# Patient Record
Sex: MALE | Race: WHITE | URBAN - METROPOLITAN AREA
[De-identification: names, ages, dates, MRNs, and addresses within clinical notes are randomized per-mention and may not be internally consistent; named-entity substitution may affect disease eponyms.]

---

## 2021-10-08 ENCOUNTER — OFFICE VISIT (OUTPATIENT)
Dept: URBAN - METROPOLITAN AREA CLINIC 23 | Facility: CLINIC | Age: 56
End: 2021-10-08

## 2021-10-18 ENCOUNTER — WEB ENCOUNTER (OUTPATIENT)
Dept: URBAN - METROPOLITAN AREA CLINIC 23 | Facility: CLINIC | Age: 56
End: 2021-10-18

## 2021-10-18 ENCOUNTER — OFFICE VISIT (OUTPATIENT)
Dept: URBAN - METROPOLITAN AREA CLINIC 23 | Facility: CLINIC | Age: 56
End: 2021-10-18
Payer: COMMERCIAL

## 2021-10-18 ENCOUNTER — LAB OUTSIDE AN ENCOUNTER (OUTPATIENT)
Dept: URBAN - METROPOLITAN AREA CLINIC 23 | Facility: CLINIC | Age: 56
End: 2021-10-18

## 2021-10-18 VITALS
WEIGHT: 196 LBS | HEART RATE: 75 BPM | HEIGHT: 73 IN | SYSTOLIC BLOOD PRESSURE: 127 MMHG | TEMPERATURE: 96.2 F | DIASTOLIC BLOOD PRESSURE: 90 MMHG | BODY MASS INDEX: 25.98 KG/M2

## 2021-10-18 DIAGNOSIS — R10.12 LUQ ABDOMINAL PAIN: ICD-10-CM

## 2021-10-18 DIAGNOSIS — R12 HEARTBURN: ICD-10-CM

## 2021-10-18 DIAGNOSIS — Z86.010 PERSONAL HISTORY OF COLON POLYPS: ICD-10-CM

## 2021-10-18 DIAGNOSIS — R10.13 DYSPEPSIA: ICD-10-CM

## 2021-10-18 PROCEDURE — 99214 OFFICE O/P EST MOD 30 MIN: CPT | Performed by: INTERNAL MEDICINE

## 2021-10-18 PROCEDURE — 99244 OFF/OP CNSLTJ NEW/EST MOD 40: CPT | Performed by: INTERNAL MEDICINE

## 2021-10-18 RX ORDER — ROSUVASTATIN CALCIUM 20 MG/1
1 TABLET TABLET, FILM COATED ORAL ONCE A DAY
Status: ACTIVE | COMMUNITY

## 2021-10-18 RX ORDER — LISINOPRIL 10 MG/1
1 TABLET TABLET ORAL ONCE A DAY
Status: ACTIVE | COMMUNITY

## 2021-10-18 NOTE — HPI-TODAY'S VISIT:
- 57 yo  male from [place], referred by Dr. Gurwinder Juarez for further evaluation of a NEW GASTROINTESTINAL COMPLAINT: LUQ abdominal pain.  A copy of this note will be sent to the referring provider. - LUQ abdominal pain over the past 6 months.  The pain is intermittent, occurring 2-3 times per week.  Describes it as a dull ache occurring postprandially, 2-3/10 in intensity, nonradiating.  The discomfort lasts a couple of hours prior to subsiding.  States he has had IBS all his life but that this discomfort is different.  Denies nausea or vomiting. - Gets heartburn and dyspepsia symptoms 2-3 times per month for which he may take Tums PRN - As directed by his PCP, he started taking Prilosec yesterday to see if it improves the above symptoms. - Takes Aleve 4-5 times per month for back pains  - States he has a history of benign colon polyps on his last colonoscopy done 13-14 years ago.   - Denies family history of GI malignancies in any first degree relatives - Had negative bloodwork done at PCPs office recently

## 2021-10-27 ENCOUNTER — OFFICE VISIT (OUTPATIENT)
Dept: URBAN - METROPOLITAN AREA LAB 3 | Facility: LAB | Age: 56
End: 2021-10-27
Payer: COMMERCIAL

## 2021-10-27 DIAGNOSIS — D12.0 ADENOMA OF CECUM: ICD-10-CM

## 2021-10-27 DIAGNOSIS — Z86.010 ADENOMAS PERSONAL HISTORY OF COLONIC POLYPS: ICD-10-CM

## 2021-10-27 DIAGNOSIS — K29.30 CHRONIC SUPERFICIAL GASTRITIS: ICD-10-CM

## 2021-10-27 DIAGNOSIS — K62.1 ANAL AND RECTAL POLYP: ICD-10-CM

## 2021-10-27 DIAGNOSIS — B37.81 CANDIDA: ICD-10-CM

## 2021-10-27 DIAGNOSIS — D12.4 ADENOMA OF DESCENDING COLON: ICD-10-CM

## 2021-10-27 PROCEDURE — 45385 COLONOSCOPY W/LESION REMOVAL: CPT | Performed by: INTERNAL MEDICINE

## 2021-10-27 PROCEDURE — 45380 COLONOSCOPY AND BIOPSY: CPT | Performed by: INTERNAL MEDICINE

## 2021-10-27 PROCEDURE — 43239 EGD BIOPSY SINGLE/MULTIPLE: CPT | Performed by: INTERNAL MEDICINE

## 2021-10-27 RX ORDER — ROSUVASTATIN CALCIUM 20 MG/1
1 TABLET TABLET, FILM COATED ORAL ONCE A DAY
Status: ACTIVE | COMMUNITY

## 2021-10-27 RX ORDER — LISINOPRIL 10 MG/1
1 TABLET TABLET ORAL ONCE A DAY
Status: ACTIVE | COMMUNITY

## 2021-11-17 ENCOUNTER — TELEPHONE ENCOUNTER (OUTPATIENT)
Dept: URBAN - METROPOLITAN AREA CLINIC 92 | Facility: CLINIC | Age: 56
End: 2021-11-17

## 2021-11-17 RX ORDER — FLUCONAZOLE 100 MG/1
2 TABLETS ON DAY 1 AND 1 TABLET DAILY FOR THE REMAINING 9 DAYS TABLET ORAL QD
Qty: 11 | Refills: 0 | OUTPATIENT
Start: 2021-11-17 | End: 2021-11-27

## 2023-01-18 ENCOUNTER — OFFICE VISIT (OUTPATIENT)
Dept: URBAN - METROPOLITAN AREA CLINIC 111 | Facility: CLINIC | Age: 58
End: 2023-01-18
Payer: COMMERCIAL

## 2023-01-18 VITALS
DIASTOLIC BLOOD PRESSURE: 84 MMHG | BODY MASS INDEX: 26.11 KG/M2 | WEIGHT: 197 LBS | HEART RATE: 101 BPM | HEIGHT: 73 IN | SYSTOLIC BLOOD PRESSURE: 134 MMHG | TEMPERATURE: 97.7 F

## 2023-01-18 DIAGNOSIS — K62.89 ANAL PAIN: ICD-10-CM

## 2023-01-18 DIAGNOSIS — K64.8 INTERNAL HEMORRHOIDS WITH COMPLICATION: ICD-10-CM

## 2023-01-18 DIAGNOSIS — K60.2 ANAL FISSURE: ICD-10-CM

## 2023-01-18 PROCEDURE — 46221 LIGATION OF HEMORRHOID(S): CPT | Performed by: INTERNAL MEDICINE

## 2023-01-18 PROCEDURE — 99214 OFFICE O/P EST MOD 30 MIN: CPT | Performed by: INTERNAL MEDICINE

## 2023-01-18 RX ORDER — LISINOPRIL 10 MG/1
1 TABLET TABLET ORAL ONCE A DAY
Status: ACTIVE | COMMUNITY

## 2023-01-18 RX ORDER — ESCITALOPRAM OXALATE 10 MG/1
1 TABLET TABLET, FILM COATED ORAL ONCE A DAY
Status: ACTIVE | COMMUNITY

## 2023-01-18 RX ORDER — NIFEDIPINE
PEA SIZE AMOUNT POWDER (GRAM) MISCELLANEOUS
Qty: 60 GRAMS | Refills: 1 | OUTPATIENT
Start: 2023-01-18 | End: 2023-07-17

## 2023-01-18 RX ORDER — ROSUVASTATIN CALCIUM 20 MG/1
1 TABLET TABLET, FILM COATED ORAL ONCE A DAY
Status: ACTIVE | COMMUNITY

## 2023-01-18 NOTE — PHYSICAL EXAM GASTROINTESTINAL
Abdomen , soft, nontender, nondistended , no guarding or rigidity , no masses palpable , normal bowel sounds , Liver and Spleen , no hepatomegaly present , no hepatosplenomegaly , liver nontender , spleen not palpable Normal anal sphincter tone. Grade I-II Internal hemorrhoids.  Anal fissure noted.  No blood or mass on DANILO.

## 2023-01-18 NOTE — HPI-TODAY'S VISIT:
- 55 yo  male from [place], referred by Dr. Gurwinder Juarez for anal pain.  A copy of this note will be sent to the referring provider. -Reviewed E/C and path results with patient. Biopsies were negative for H. pylori or celiac sprue.  Esophageal biopsies consistent with Candida esophagitis.  2 of the polyps were tubular adenomas, the other one was hyperplastic.  A repeat colonoscopy is advised in 5 years. - Gets heartburn and dyspepsia symptoms 2-3 times per month for which he may take Tums PRN - As directed by his PCP, he started taking Prilosec. - Takes Aleve 4-5 times per month for back pains  - Denies family history of GI malignancies in any first degree relatives

## 2023-01-18 NOTE — PHYSICAL EXAM NEUROLOGIC:
oriented to person, place and time , normal sensation , short and long term memory intact
contact guard

## 2023-02-06 ENCOUNTER — OFFICE VISIT (OUTPATIENT)
Dept: URBAN - METROPOLITAN AREA CLINIC 111 | Facility: CLINIC | Age: 58
End: 2023-02-06
Payer: COMMERCIAL

## 2023-02-06 VITALS
HEIGHT: 73 IN | SYSTOLIC BLOOD PRESSURE: 122 MMHG | HEART RATE: 81 BPM | DIASTOLIC BLOOD PRESSURE: 81 MMHG | TEMPERATURE: 97.3 F | WEIGHT: 198 LBS | BODY MASS INDEX: 26.24 KG/M2

## 2023-02-06 DIAGNOSIS — Z86.010 HISTORY OF ADENOMATOUS POLYP OF COLON: ICD-10-CM

## 2023-02-06 DIAGNOSIS — K62.89 ANAL PAIN: ICD-10-CM

## 2023-02-06 DIAGNOSIS — K64.8 INTERNAL HEMORRHOIDS WITH COMPLICATION: ICD-10-CM

## 2023-02-06 DIAGNOSIS — K60.2 ANAL FISSURE: ICD-10-CM

## 2023-02-06 PROCEDURE — 46221 LIGATION OF HEMORRHOID(S): CPT | Performed by: INTERNAL MEDICINE

## 2023-02-06 PROCEDURE — 99214 OFFICE O/P EST MOD 30 MIN: CPT | Performed by: INTERNAL MEDICINE

## 2023-02-06 RX ORDER — ROSUVASTATIN CALCIUM 20 MG/1
1 TABLET TABLET, FILM COATED ORAL ONCE A DAY
Status: ACTIVE | COMMUNITY

## 2023-02-06 RX ORDER — LISINOPRIL 10 MG/1
1 TABLET TABLET ORAL ONCE A DAY
Status: ACTIVE | COMMUNITY

## 2023-02-06 RX ORDER — NIFEDIPINE
PEA SIZE AMOUNT POWDER (GRAM) MISCELLANEOUS
Qty: 60 GRAMS | Refills: 1 | Status: ACTIVE | COMMUNITY
Start: 2023-01-18 | End: 2023-07-17

## 2023-02-06 RX ORDER — ESCITALOPRAM OXALATE 10 MG/1
1 TABLET TABLET, FILM COATED ORAL ONCE A DAY
Status: ACTIVE | COMMUNITY

## 2023-02-06 NOTE — PHYSICAL EXAM GASTROINTESTINAL
Abdomen , soft, nontender, nondistended , no guarding or rigidity , no masses palpable , normal bowel sounds , Liver and Spleen, no hepatosplenomegaly , liver nontender. Normal anal sphincter tone. Grade II Internal hemorrhoids.  No blood or mass on DANILO.

## 2023-02-06 NOTE — EXAM-PHYSICAL EXAM
ANORECTAL EXAM AND ANOSCOPY:  Perineum: Appearance and structural integrity within normal limits, no rashes or skin lesions present.  Anus: No masses present, no anal bleeding present, no visible external hemorrhoids.  Anoscopy with grade 2 hemorrhoids in right anterior column.  Left lateral column has smaller, grade 1 hemorrhoids.  No hemorrhoids in right posterior column following recent hemorrhoid banding in this area.  Rectum: On digital rectal exam, there is less anal sphincter spasticity and a small anterior anal fissure noted, improved from previous exam.  No rectal masses present.  Previously placed band not palpated; it appears to have appropriately fallen off.

## 2023-02-06 NOTE — HPI-TODAY'S VISIT:
- 56 yo  male from [place] who returns for follow-up and for his 2nd hemorrhoid banding session - For his posterior anal fissure, he is using topical therapy with a compounded ointment of nifedipine 0.3% and lidocaine 5%, applying a pea-sized amount both rob- and intra-anally 3 times a day, as I had previously prescribed for him - I had previously banded the right posterior hemorrhoidal column and the patient did very well - The hemorrhoid symptoms which the patient previously described are now much improved.  Patient has no major GI complaints today. - I have discussed with the patient the findings of previous endoscopic procedures and pathology results.  All questions were answered to their satisfaction.

## 2023-02-22 ENCOUNTER — OFFICE VISIT (OUTPATIENT)
Dept: URBAN - METROPOLITAN AREA CLINIC 111 | Facility: CLINIC | Age: 58
End: 2023-02-22
Payer: COMMERCIAL

## 2023-02-22 ENCOUNTER — DASHBOARD ENCOUNTERS (OUTPATIENT)
Age: 58
End: 2023-02-22

## 2023-02-22 VITALS
BODY MASS INDEX: 26.85 KG/M2 | HEART RATE: 94 BPM | DIASTOLIC BLOOD PRESSURE: 87 MMHG | WEIGHT: 202.6 LBS | HEIGHT: 73 IN | SYSTOLIC BLOOD PRESSURE: 132 MMHG | TEMPERATURE: 97.8 F

## 2023-02-22 DIAGNOSIS — K60.2 ANAL FISSURE: ICD-10-CM

## 2023-02-22 DIAGNOSIS — K64.1 BLEEDING GRADE II HEMORRHOIDS: ICD-10-CM

## 2023-02-22 DIAGNOSIS — K62.89 ANAL PAIN: ICD-10-CM

## 2023-02-22 PROCEDURE — 99213 OFFICE O/P EST LOW 20 MIN: CPT | Performed by: INTERNAL MEDICINE

## 2023-02-22 PROCEDURE — 46221 LIGATION OF HEMORRHOID(S): CPT | Performed by: INTERNAL MEDICINE

## 2023-02-22 RX ORDER — LISINOPRIL 10 MG/1
1 TABLET TABLET ORAL ONCE A DAY
Status: ACTIVE | COMMUNITY

## 2023-02-22 RX ORDER — ESCITALOPRAM OXALATE 10 MG/1
1 TABLET TABLET, FILM COATED ORAL ONCE A DAY
Status: ACTIVE | COMMUNITY

## 2023-02-22 RX ORDER — ROSUVASTATIN CALCIUM 20 MG/1
1 TABLET TABLET, FILM COATED ORAL ONCE A DAY
Status: ACTIVE | COMMUNITY

## 2023-02-22 RX ORDER — NIFEDIPINE
PEA SIZE AMOUNT POWDER (GRAM) MISCELLANEOUS
Qty: 60 GRAMS | Refills: 1 | Status: ACTIVE | COMMUNITY
Start: 2023-01-18 | End: 2023-07-17

## 2023-02-22 NOTE — HPI-TODAY'S VISIT:
- 56 yo  male from [place] who returns for follow-up and for his 3rd hemorrhoid banding session - For his posterior anal fissure, he is using topical therapy with a compounded ointment of nifedipine 0.3% and lidocaine 5%, applying a pea-sized amount both rob- and intra-anally 3 times a day, as I had previously prescribed for him - I had previously banded the right posterior and right anterior hemorrhoidal columns and the patient did very well - The hemorrhoid symptoms which the patient previously described are now much improved.  Patient has no major GI complaints today. - I have discussed with the patient the findings of previous endoscopic procedures and pathology results.  All questions were answered to their satisfaction.

## 2023-02-22 NOTE — PHYSICAL EXAM GASTROINTESTINAL
Abdomen , soft, nontender, nondistended , no guarding or rigidity , no masses palpable , normal bowel sounds , Liver and Spleen, no hepatosplenomegaly , liver nontender, Normal anal sphincter tone. Grade II Internal hemorrhoids.  No blood or mass on DANILO.

## 2023-03-09 PROBLEM — 429047008 HISTORY OF ADENOMATOUS POLYP OF COLON: Status: ACTIVE | Noted: 2023-02-24

## 2025-01-27 ENCOUNTER — OFFICE VISIT (OUTPATIENT)
Dept: URBAN - METROPOLITAN AREA CLINIC 111 | Facility: CLINIC | Age: 60
End: 2025-01-27
Payer: COMMERCIAL

## 2025-01-27 ENCOUNTER — LAB OUTSIDE AN ENCOUNTER (OUTPATIENT)
Dept: URBAN - METROPOLITAN AREA CLINIC 111 | Facility: CLINIC | Age: 60
End: 2025-01-27

## 2025-01-27 VITALS
DIASTOLIC BLOOD PRESSURE: 84 MMHG | WEIGHT: 200 LBS | HEART RATE: 82 BPM | BODY MASS INDEX: 26.51 KG/M2 | HEIGHT: 73 IN | SYSTOLIC BLOOD PRESSURE: 125 MMHG | TEMPERATURE: 97.9 F

## 2025-01-27 DIAGNOSIS — Z86.0101 HISTORY OF ADENOMATOUS POLYP OF COLON: ICD-10-CM

## 2025-01-27 DIAGNOSIS — K59.01 CONSTIPATION: ICD-10-CM

## 2025-01-27 DIAGNOSIS — R13.10 ODYNOPHAGIA: ICD-10-CM

## 2025-01-27 DIAGNOSIS — K58.1 IBS: ICD-10-CM

## 2025-01-27 DIAGNOSIS — R10.10 UPPER ABDOMINAL PAIN: ICD-10-CM

## 2025-01-27 DIAGNOSIS — R12 HEARTBURN: ICD-10-CM

## 2025-01-27 PROCEDURE — 99214 OFFICE O/P EST MOD 30 MIN: CPT | Performed by: PHYSICIAN ASSISTANT

## 2025-01-27 RX ORDER — ESCITALOPRAM OXALATE 10 MG/1
1 TABLET TABLET, FILM COATED ORAL ONCE A DAY
Status: ON HOLD | COMMUNITY

## 2025-01-27 RX ORDER — LISINOPRIL 10 MG/1
1 TABLET TABLET ORAL ONCE A DAY
Status: ACTIVE | COMMUNITY

## 2025-01-27 RX ORDER — ROSUVASTATIN 20 MG/1
1 TABLET TABLET, FILM COATED ORAL ONCE A DAY
Status: ACTIVE | COMMUNITY

## 2025-01-27 NOTE — HPI-TODAY'S VISIT:
58 y/o male here with upper abdominal pain. Seen by Dr. Nguyễn in 2023 for anal fissure, anal pain, internal hemorrhoids, and h/o adenomatous colon polyp. Fissure was better and pt was told to continue ointment from compounding pharmacy. Pt also had banding performed. S/p EGD/colon in 10/2021. EGD with a few whitish plaques in distal esophagus. Colon revealing 3 polyps and medium-sized external/internal hemorrhoids. Path revealing candida, 2 tubular adenomas, and 1 hyperplastic polyp. Repeat colon recommended in 5 years.   He has a h/o anxiety and it can affect his abdomen/stools. He has been getting a lot of burning in epigastrium and in LUQ. Denies severe pain. He states he ate a hamburger 1.5 weeks ago and felt bad after that. He started OTC Prilosec a week ago which is helping. He has also been eating a bland diet now which is helping some. He has had a little reflux and indigestion as well. No N/V. He has been very constipated. Baseline with stool frequency is daily. He alternates between diarrhea and constipation which is not new. He has a h/o IBS. He has been constipated for 2 weeks. Also reports having more gas. He is not taking anything for it. He has taken a stool softener in the past. No rectal bleeding. He has seen some black stool but reports taking Pepto last week. No NSAIDs. He has noticed food sometimes taking longer to go down and sometimes having pain with swallowing. No weight loss. No FH of GI cancer. No heart, lung, or kidney problems. Social use of alcohol on weekends. Denies excessive use.

## 2025-01-27 NOTE — PHYSICAL EXAM GASTROINTESTINAL
soft, mild tenderness in epigastrium, nondistended,  no guarding or rigidity,  no masses palpable,  normal bowel sounds,  no hepatosplenomegaly,  no rebound tenderness

## 2025-02-12 ENCOUNTER — OFFICE VISIT (OUTPATIENT)
Dept: URBAN - METROPOLITAN AREA LAB 3 | Facility: LAB | Age: 60
End: 2025-02-12
Payer: COMMERCIAL

## 2025-02-12 DIAGNOSIS — K29.60 ADENOPAPILLOMATOSIS GASTRICA: ICD-10-CM

## 2025-02-12 DIAGNOSIS — K31.7 BENIGN GASTRIC POLYP: ICD-10-CM

## 2025-02-12 PROCEDURE — 43239 EGD BIOPSY SINGLE/MULTIPLE: CPT | Performed by: INTERNAL MEDICINE

## 2025-02-12 RX ORDER — ESCITALOPRAM OXALATE 10 MG/1
1 TABLET TABLET, FILM COATED ORAL ONCE A DAY
Status: ON HOLD | COMMUNITY

## 2025-02-12 RX ORDER — LISINOPRIL 10 MG/1
1 TABLET TABLET ORAL ONCE A DAY
Status: ACTIVE | COMMUNITY

## 2025-02-12 RX ORDER — ROSUVASTATIN 20 MG/1
1 TABLET TABLET, FILM COATED ORAL ONCE A DAY
Status: ACTIVE | COMMUNITY

## 2025-02-24 ENCOUNTER — OFFICE VISIT (OUTPATIENT)
Dept: URBAN - METROPOLITAN AREA CLINIC 111 | Facility: CLINIC | Age: 60
End: 2025-02-24
Payer: COMMERCIAL

## 2025-02-24 VITALS
DIASTOLIC BLOOD PRESSURE: 87 MMHG | HEIGHT: 73 IN | WEIGHT: 197 LBS | SYSTOLIC BLOOD PRESSURE: 126 MMHG | TEMPERATURE: 98.1 F | BODY MASS INDEX: 26.11 KG/M2 | HEART RATE: 80 BPM

## 2025-02-24 DIAGNOSIS — Z86.0101 HISTORY OF ADENOMATOUS POLYP OF COLON: ICD-10-CM

## 2025-02-24 DIAGNOSIS — R12 HEARTBURN: ICD-10-CM

## 2025-02-24 DIAGNOSIS — K58.1 IBS: ICD-10-CM

## 2025-02-24 DIAGNOSIS — K59.01 CONSTIPATION: ICD-10-CM

## 2025-02-24 PROCEDURE — 99213 OFFICE O/P EST LOW 20 MIN: CPT | Performed by: PHYSICIAN ASSISTANT

## 2025-02-24 RX ORDER — ESCITALOPRAM OXALATE 10 MG/1
1 TABLET TABLET, FILM COATED ORAL ONCE A DAY
Status: ON HOLD | COMMUNITY

## 2025-02-24 RX ORDER — LISINOPRIL 10 MG/1
1 TABLET TABLET ORAL ONCE A DAY
Status: ACTIVE | COMMUNITY

## 2025-02-24 RX ORDER — ROSUVASTATIN 20 MG/1
1 TABLET TABLET, FILM COATED ORAL ONCE A DAY
Status: ACTIVE | COMMUNITY

## 2025-02-24 NOTE — PHYSICAL EXAM CONSTITUTIONAL:
well developed, well nourished, in no acute distress, ambulating without difficulty, normal communication ability Alert and oriented to person, place and time

## 2025-02-24 NOTE — HPI-TODAY'S VISIT:
58 y/o male here to follow up after EGD. Seen by me on 1/27/25 for upper abdominal pain, heartburn, odynophagia, IBS, h/o adenomatous colon polyp, and constipation. RUQ u/s was also ordered and was normal. Pt was told to continue OTC Prilosec. Pt was also told to continue low fat diet and try miralax. S/p EGD on 2/12 by Dr. Nguyễn revealing a few small gastric polyps. Path revealing fundic gland polyps.   He is feeling much better. He is taking OTC Prilosec still and watching his diet. He tried miralax for 3 days and then stopped d/t diarrhea. He is feeling constipated again.   Previous: Colon in 2021 revealing 3 polyps and medium-sized external/internal hemorrhoids. Path revealing candida, 2 tubular adenomas, and 1 hyperplastic polyp. Repeat colon recommended in 5 years. Pt underwent hemorrhoid banding in 2023. He has a h/o anxiety and it can affect his abdomen/stools. He alternates between diarrhea and constipation which is not new. He has a h/o IBS.

## 2025-02-26 ENCOUNTER — OFFICE VISIT (OUTPATIENT)
Dept: URBAN - METROPOLITAN AREA CLINIC 111 | Facility: CLINIC | Age: 60
End: 2025-02-26

## 2025-02-27 ENCOUNTER — OFFICE VISIT (OUTPATIENT)
Dept: URBAN - METROPOLITAN AREA CLINIC 111 | Facility: CLINIC | Age: 60
End: 2025-02-27

## 2025-04-29 NOTE — PHYSICAL EXAM SKIN:
no rashes , no jaundice present , good turgor , no masses , no tenderness on palpation PAST SURGICAL HISTORY:  Bilateral myopia s/p laser  surgery    History of thumb surgery     S/P  Section     Status post finger joint fusion